# Patient Record
Sex: MALE | Race: WHITE
[De-identification: names, ages, dates, MRNs, and addresses within clinical notes are randomized per-mention and may not be internally consistent; named-entity substitution may affect disease eponyms.]

---

## 2020-09-29 ENCOUNTER — HOSPITAL ENCOUNTER (EMERGENCY)
Dept: HOSPITAL 74 - JER | Age: 70
Discharge: HOME | End: 2020-09-29
Payer: COMMERCIAL

## 2020-09-29 VITALS — BODY MASS INDEX: 27.9 KG/M2

## 2020-09-29 VITALS — SYSTOLIC BLOOD PRESSURE: 161 MMHG | HEART RATE: 76 BPM | DIASTOLIC BLOOD PRESSURE: 90 MMHG | TEMPERATURE: 98 F

## 2020-09-29 DIAGNOSIS — L03.114: Primary | ICD-10-CM

## 2020-09-29 NOTE — PDOC
History of Present Illness





- General


Chief Complaint: Wound


Stated Complaint: POSSIBLE INFECTED FINGER


Time Seen by Provider: 09/29/20 10:54


History Source: Patient


Exam Limitations: No Limitations





- History of Present Illness


Initial Comments: 





09/29/20 11:10


68y/o M HTN, pre-diabetic (hospital employee) with 4d progressive Left ring 

finger swelling/discomfort. no injury or known bite, began having discomfort 4d 

ago then noted some swelling. Had to have wedding ring removed, swelling 

persisted but no motor/sensory deficit. Pt applied ice with some improvement, 

but today noted persistent swelling and redness to the digit, so he was referred

to the ED by nursing colleagues for evaluation. 





no numbness/tingling, no fever/chills, no injury/strain, no animal bite, ? 

insect bite. no h/o same or recurrent skin infections. 





no other complaints.








Past History





- Medical History


Allergies/Adverse Reactions: 


                                    Allergies











Allergy/AdvReac Type Severity Reaction Status Date / Time


 


No Known Allergies Allergy   Verified 12/09/12 18:02











Home Medications: 


Ambulatory Orders





Aspirin [ASA -] 81 mg PO DAILY 11/05/15 


Cholecalciferol (Vitamin D3) [Vitamin D3] 1 tab PO DAILY 11/05/15 


Docosahexanoic Acid/Epa [Fish Oil Softgel] 1 cap PO DAILY 11/05/15 


Hydrochlorothiazide [Hctz -] 1 tab PO DAILY 11/05/15 


Metoprolol Succinate [Toprol Xl] 1 tab PO DAILY 11/05/15 


Amoxicillin/Potassium Clav [Augmentin 875-125 Tablet] 1 each PO BID #20 tablet 

09/29/20 


Sulfamethoxazole/Trimethoprim [Bactrim Ds -] 1 tab PO BID #20 tablet 09/29/20 








Anemia: No


Asthma: No


Cancer: No


Cardiac Disorders: No


CVA: No


COPD: No


CHF: No


Dementia: No


Diabetes: No


GI Disorders: Yes (H/O COLON POLYPS,H.PYLORI,GASTRITIS,GERD,DUODENAL 

ULCER,HEMORRHOIDS)


 Disorders: No


HTN: Yes


Hypercholesterolemia: Yes


Liver Disease: No


Seizures: No


Thyroid Disease: No





- Surgical History


Abdominal Surgery: No


Appendectomy: No


Cardiac Surgery: No


Cholecystectomy: No


Lung Surgery: No


Neurologic Surgery: No


Orthopedic Surgery: Yes (ARTHROSCOPY RIGHT SHOULDER)





- Immunization History


Td Vaccination: No





- Psycho-Social/Smoking History


Smoking Status: Yes


Smoking History: Never smoked


Have you smoked in the past 12 months: No


Number of Cigarettes Smoked Daily: 0


Information on smoking cessation initiated: No





- Substance Abuse Hx (Audit-C & DAST Scrn)


How often the patient has a drink containing alcohol: Never


Score: In Men: 4 or > Positive; In Women: 3 or > Positive: 0


Screen Result (Pos requires Nsg. Audit-10AR): Negative


In the last yr the pt used illegal drug/Rx for NonMed reason: No


Score:  Yes response is considered Positive: 0


Screen Result (Positive result requires Nsg. DAST-10): Negative





**Review of Systems





- Review of Systems


Constitutional: No: Chills, Fever, Night Sweats


Musculoskeletal: Yes: See HPI


Integumentary: Yes: See HPI


Neurological: No: Tingling, Weakness





*Physical Exam





- Vital Signs


                                Last Vital Signs











Temp Pulse Resp BP Pulse Ox


 


 98.0 F   76   18   161/90   100 


 


 09/29/20 10:48  09/29/20 10:48  09/29/20 10:48  09/29/20 10:48  09/29/20 10:48














- Physical Exam





09/29/20 11:13


Afebrile, vital signs stable


GENERAL: The patient is awake, alert, and fully oriented, in no acute distress.


HEAD: Normal with no signs of trauma.


EYES: Pupils equal, round and reactive to light, extraocular movements intact, 

sclera anicteric, conjunctiva clear.


EXTREMITIES: Left hand: There is warm and tender erythema/induration along the 

radial aspect of the left ring finger over the proximal and middle phalanx, 

there is a pinpoint blister on the radial aspect of the middle phalanx without 

foreign body or fluctuance.  There is no joint effusion, there is no tenderness 

along the volar aspect of the digit or pain with full extension, there is 

slightly limited PIP flexion 2/2 swelling but 5/5 flexion/extension at 

MCP/PIP/DIP, sensation intact distally with brisk cap refill. No vesicular 

lesions.


NEUROLOGICAL: Normal speech, normal gait.


PSYCH: Normal mood, normal affect.


SKIN: See above





Medical Decision Making





- Medical Decision Making





09/29/20 11:16


69-year-old male with prediabetes presents with left fourth digit cellulitis, 

question secondary to skin abrasion versus possible insect bite, no evidence of 

foreign body or deep tissue infection or joint infection, no proximal tracking 

cellulitis, no systemic symptoms.  Neurovascularly intact.


No indication for emergent imaging, no purulent collection amenable to draining 

at this time


We will treat with Bactrim and Augmentin given possible exposure to MRSA and 

prediabetic status


Elevation and ice to reduce swelling


Understands return criteria





Discharge





- Discharge Information


Problems reviewed: Yes


Clinical Impression/Diagnosis: 


 Cellulitis of finger of left hand





Condition: Stable


Disposition: HOME





- Additional Discharge Information


Prescriptions: 


Amoxicillin/Potassium Clav [Augmentin 875-125 Tablet] 1 each PO BID #20 tablet


Sulfamethoxazole/Trimethoprim [Bactrim Ds -] 1 tab PO BID #20 tablet





- Follow up/Referral


Referrals: 


Jeovanny Murray MD [Staff Physician] - 





- Patient Discharge Instructions


Patient Printed Discharge Instructions:  DI for Cellulitis -- Adult


Additional Instructions: 


Activity as tolerated. Stay hydrated. 





The redness and swelling are likely due to a skin infection of the left ring 

finger.  Take Bactrim and Augmentin as prescribed as antibiotics for infection.





Tylenol 1000 mg every 8 hours and/or ibuprofen 600 mg every 8 hours as needed 

for pain. Ice and elevate the affected areas for 20 minutes every 3-4 hours to 

reduce swelling.





Continue your medications as previously prescribed by your physician. 





You should follow up with your primary doctor or a hand specialist (consider 

calling Dr. Murray) as needed regarding today's emergency department visit.





Return to the emergency department for any new or concerning symptoms, 

particularly worsening swelling or pain, increasing redness to the hand, fevers 

or chills, stiffness or numbness or weakness.





- Post Discharge Activity

## 2021-02-06 ENCOUNTER — HOSPITAL ENCOUNTER (EMERGENCY)
Dept: HOSPITAL 74 - JER | Age: 71
Discharge: HOME | End: 2021-02-06
Payer: COMMERCIAL

## 2021-02-06 VITALS — SYSTOLIC BLOOD PRESSURE: 137 MMHG | DIASTOLIC BLOOD PRESSURE: 75 MMHG | HEART RATE: 72 BPM

## 2021-02-06 VITALS — TEMPERATURE: 98 F

## 2021-02-06 VITALS — BODY MASS INDEX: 27.9 KG/M2

## 2021-02-06 DIAGNOSIS — U07.1: Primary | ICD-10-CM

## 2021-02-06 PROCEDURE — M0239 BAMLANIVIMAB-XXXX INFUSION: HCPCS

## 2021-06-21 ENCOUNTER — HOSPITAL ENCOUNTER (OUTPATIENT)
Dept: HOSPITAL 74 - JASU-ENDO | Age: 71
Discharge: HOME | End: 2021-06-21
Attending: INTERNAL MEDICINE
Payer: COMMERCIAL

## 2021-06-21 VITALS — HEART RATE: 66 BPM | SYSTOLIC BLOOD PRESSURE: 116 MMHG | DIASTOLIC BLOOD PRESSURE: 67 MMHG

## 2021-06-21 VITALS — TEMPERATURE: 97.9 F

## 2021-06-21 VITALS — BODY MASS INDEX: 27.9 KG/M2

## 2021-06-21 DIAGNOSIS — K57.30: ICD-10-CM

## 2021-06-21 DIAGNOSIS — K64.8: ICD-10-CM

## 2021-06-21 DIAGNOSIS — Z86.010: ICD-10-CM

## 2021-06-21 DIAGNOSIS — K62.1: ICD-10-CM

## 2021-06-21 DIAGNOSIS — D12.2: ICD-10-CM

## 2021-06-21 DIAGNOSIS — Z12.11: Primary | ICD-10-CM

## 2021-06-21 DIAGNOSIS — I10: ICD-10-CM

## 2021-06-21 DIAGNOSIS — D12.5: ICD-10-CM

## 2021-06-21 PROCEDURE — 0DBK8ZX EXCISION OF ASCENDING COLON, VIA NATURAL OR ARTIFICIAL OPENING ENDOSCOPIC, DIAGNOSTIC: ICD-10-PCS | Performed by: INTERNAL MEDICINE

## 2021-06-21 PROCEDURE — 0DBP8ZX EXCISION OF RECTUM, VIA NATURAL OR ARTIFICIAL OPENING ENDOSCOPIC, DIAGNOSTIC: ICD-10-PCS | Performed by: INTERNAL MEDICINE

## 2021-06-21 PROCEDURE — 0DBN8ZX EXCISION OF SIGMOID COLON, VIA NATURAL OR ARTIFICIAL OPENING ENDOSCOPIC, DIAGNOSTIC: ICD-10-PCS | Performed by: INTERNAL MEDICINE
